# Patient Record
(demographics unavailable — no encounter records)

---

## 2024-11-13 NOTE — DISCUSSION/SUMMARY
[de-identified] : The patient presents today for follow-up regarding right knee osteoarthritis.  Patient is noting worsening pain about the right knee.  Symptoms have increased significantly since the last office visit.  I discussed treatment options and I recommended right total knee replacement.  Discussed the procedure with her at length.  Discussed the risks benefits alternative treatment plans.  We discussed rehabilitation protocol and expectations.  Patient had the opportunity to ask any questions which were answered to her satisfaction.  Will schedule her surgery sometime after the new year.  At least 30 minutes was spent performing the evaluation and management on today's office visit.  This includes but is not limited to preparing to see patient including review of any test results or outside medical records, obtaining and/or reviewing separately obtained history, performing examination and evaluation, counseling and educating the patient on their diagnosis and treatment recommendations, ordering medications, tests, or procedures, documenting clinical information in the electronic health record, independently interpreting results (not separately reported) and communicating results to the patient, and coordination of care.

## 2024-11-13 NOTE — PHYSICAL EXAM
[de-identified] : The patient appears well nourished  and in no apparent distress.  The patient is alert and oriented to person, place, and time.   Affect and mood appear normal.    The head is normocephalic and atraumatic.  The eyes reveal normal sclera and extra ocular muscles are intact.   The neck appears normal with no jugular venous distention or masses noted.   Skin shows normal turgor with no evidence of eczema or psoriasis.  No respiratory distress noted.  The patient ambulates with an antalgic gait.  The right knee has range of motion 0 to 120 degrees.  There is varus alignment.  There is crepitations.  There is tenderness about the medial patellofemoral joints to palpation.  There is no soft tissue swelling, warmth, or erythema.   There is a negative Lachman sign.  There is no instability to varus/valgus stress.  There is no instability to anterior/posterior drawer.  There is normal strength  in the quadriceps and hamstring muscles.  Strength and sensation are intact distally.  There are normal pulses distally and good capillary refill.  No edema or lymphadenopathy noted.     [de-identified] : AP, lateral, tunnel, and merchant views of the right knee were obtained.  There is varus alignment about the knee.  There is severe medial compartment arthritis.  There are multiple loose bodies noted.  There is also moderate patellofemoral arthritis.  No fractures or dislocations are noted.

## 2024-11-13 NOTE — HISTORY OF PRESENT ILLNESS
[de-identified] : The patient presents today for follow-up regarding osteoarthritis about the right knee.  Patient had worsening symptoms recently.  Patient did have Euflexxa injections in April of this year.  Pain level 5 out of 10.  Pain worse with activity such as ambulation and stair climbing.  Pain is improved with rest.  Patient takes Advil or ibuprofen for the pain.

## 2024-11-13 NOTE — REASON FOR VISIT
[Follow-Up Visit] : a follow-up visit for [Knee Pain] : knee pain [FreeTextEntry2] : Primary osteoarthritis of the right knee.

## 2025-06-02 NOTE — DISCUSSION/SUMMARY
[de-identified] : Patient presents today for follow-up regarding right knee osteoarthritis.  Patient continues complain of pain despite viscosupplementation and conservative treatment.  Due to the patient's worsening pain as well as x-ray findings of significant osteoarthritis I recommended total knee replacement with the patient.  The risks benefits and alternative treatment plans of the surgical procedure were explained to the patient. The patient had the opportunity to ask any questions which answered to their satisfaction. The patient will schedule surgery at their convenience.  At least 30 minutes was spent performing the evaluation and management on today's office visit.  This includes but is not limited to preparing to see patient including review of any test results or outside medical records, obtaining and/or reviewing separately obtained history, performing examination and evaluation, counseling and educating the patient on their diagnosis and treatment recommendations, ordering medications, tests, or procedures, documenting clinical information in the electronic health record, independently interpreting results (not separately reported) and communicating results to the patient, and coordination of care.

## 2025-06-02 NOTE — PHYSICAL EXAM
[de-identified] : The patient appears well nourished  and in no apparent distress.  The patient is alert and oriented to person, place, and time.   Affect and mood appear normal.    The head is normocephalic and atraumatic.  The eyes reveal normal sclera and extra ocular muscles are intact.   The neck appears normal with no jugular venous distention or masses noted.   Skin shows normal turgor with no evidence of eczema or psoriasis.  No respiratory distress noted.  The patient ambulates with an antalgic gait.  The right knee has range of motion 0 to 120 degrees.  There is varus alignment.  There is crepitations.  There is tenderness about the medial patellofemoral joints to palpation.  There is no soft tissue swelling, warmth, or erythema.   There is a negative Lachman sign.  There is no instability to varus/valgus stress.  There is no instability to anterior/posterior drawer.  There is normal strength  in the quadriceps and hamstring muscles.  Strength and sensation are intact distally.  There are normal pulses distally and good capillary refill.  No edema or lymphadenopathy noted.

## 2025-06-02 NOTE — HISTORY OF PRESENT ILLNESS
[de-identified] : This patient presents today for follow-up regarding right knee osteoarthritis.  She did have Euflexxa injections earlier this year with slight improvement.  She Complains of significant discomfort which is 4 out of 10 but increases with activity such as ambulation and stair climbing.  She notes clicking about the knees as well.  She is having problems performing activities of daily living due to problems ambulating and climbing stairs.  She has to rest after walking 1 block.  Pain can be severe but is intermittent.

## 2025-07-08 NOTE — HISTORY OF PRESENT ILLNESS
[___ Weeks Post Op] : [unfilled] weeks post op [de-identified] : s/p right total knee replacement DOS; 6/26/25 [de-identified] : This patient presents today for the first postop visit status post right total knee replacement June 26 of this year.  Patient is taking oxycodone as needed for pain relief.  Patient going for home therapy twice a week.  She is ambulating with a walker.  She her pain level is 3 out of 10.  Denies any fever or chills.  Denies any drainage from the incision. [de-identified] : Exam of the right knee reveals range of motion is satisfactory.  The incision is clean, dry, and intact.   No evidence of any drainage is noted.  There is no warmth or erythema.  There is no ecchymosis.   There is no effusion.  The knee is stable to varus and valgus stress.  There is a negative posterior drawer.  Extensor mechanism is intact.  Patellofemoral tracking is satisfactory.  Normal puluses and sensation distally.    No edema or lymphadenopathy noted.  Strength and sensation are intact distally. [de-identified] : AP, lateral, and merchant views of the right knee were obtained.  The patient is status post total knee replacement.  The components are well fixed with good alignment. No evidence of loosening or periprosthetic fracture is noted. [de-identified] : The Patient is doing well status post total knee replacement.  The patient is going to start outpatient physical therapy.  The patient was given a prescription for outpatient physical therapy and a list of therapist in the area.  The patient will attend therapy 2-3 times a week.  Instructions were given regarding restrictions on activity level.  The patient should avoid excessive walking and stair climbing until the next post op visit. The patient was also given instructions on bathing and wound care.  The patient can shower and pat the incision dry with a towel.  Patient should avoid any pools or tubs for the next 2 weeks.   If the Dermabond tape has not been removed the patient can remove the tape at the end of this week after showering. Instructions were given regarding continuing  post operative anticoagulation.  We will see the patient back in 4 weeks for follow-up x-rays and reevaluation.